# Patient Record
Sex: MALE | Race: WHITE | NOT HISPANIC OR LATINO | Employment: UNEMPLOYED | URBAN - NONMETROPOLITAN AREA
[De-identification: names, ages, dates, MRNs, and addresses within clinical notes are randomized per-mention and may not be internally consistent; named-entity substitution may affect disease eponyms.]

---

## 2017-01-09 ENCOUNTER — OFFICE VISIT (OUTPATIENT)
Dept: OPHTHALMOLOGY | Facility: CLINIC | Age: 62
End: 2017-01-09

## 2017-01-09 DIAGNOSIS — E11.3599 PROLIFERATIVE DIABETIC RETINOPATHY WITHOUT MACULAR EDEMA ASSOCIATED WITH TYPE 2 DIABETES MELLITUS (HCC): Primary | ICD-10-CM

## 2017-01-09 DIAGNOSIS — IMO0002 NUCLEAR CATARACT, RIGHT: ICD-10-CM

## 2017-01-09 DIAGNOSIS — Z96.1 PSEUDOPHAKIA: ICD-10-CM

## 2017-01-09 DIAGNOSIS — H25.011 CORTICAL AGE-RELATED CATARACT, RIGHT: ICD-10-CM

## 2017-01-09 PROBLEM — H25.019 CORTICAL AGE-RELATED CATARACT: Status: ACTIVE | Noted: 2017-01-09

## 2017-01-09 PROCEDURE — 99024 POSTOP FOLLOW-UP VISIT: CPT | Performed by: OPHTHALMOLOGY

## 2017-01-09 NOTE — PROGRESS NOTES
Subjective   Gerardo Mcneal is a 61 y.o. male.   Chief Complaint   Patient presents with   • Pseudophakia left eye         Review of Systems    Objective   Visual Acuity (Snellen - Linear)      Right Left   Dist sc 20/80 20/25 -2            Manifest Refraction      Sphere Cylinder Axis Dist   Right       Left -0.50 +1.00 165 20/20                Not recorded            Tonometry      Right Left   Pressure  23           Neuro/Psych     Oriented x3:  Yes         Main Ophthalmology Exam     External Exam      Right Left    External Normal Normal      Slit Lamp Exam      Right Left    Lids/Lashes Normal Normal    Conjunctiva/Sclera White and quiet White and quiet    Cornea Clear Clear    Anterior Chamber Deep and quiet Deep and quiet    Iris Round and reactive Round and reactive    Lens 2+ Cortical cataract, 2+ Nuclear sclerosis Posterior chamber intraocular lens    Vitreous Vitreous hemorrhage Vitreous hemorrhage      Fundus Exam      Right Left    Disc  Normal rim 0.2 ST, 0.25 IT; 0.3DD NV sup temp    Macula  Intraretinal hemorrhage, no macular thickening    Periphery  declined dilation                Assessment/Plan   Gerardo was seen today for pseudophakia left eye.    Diagnoses and all orders for this visit:    Proliferative diabetic retinopathy without macular edema associated with type 2 diabetes mellitus    Pseudophakia  Comments:  doing well, IOP down off steroids    Nuclear cataract, right    Cortical age-related cataract, right      F/u 6 weeks  Consider CE OD  Dilate next visit, OCT

## 2017-01-09 NOTE — MR AVS SNAPSHOT
Gerardo MONROE Mcneal   1/9/2017 3:15 PM   Office Visit    Dept Phone:  [phone]   Encounter #:  [id number]    Provider:  Omi Santos MD   Department:  [facility] OPHTHALMOLOGY                Your Full Care Plan              Your Updated Medication List          This list is accurate as of: 1/9/17  3:37 PM.  Always use your most recent med list.                albuterol 108 (90 BASE) MCG/ACT inhaler   Commonly known as:  PROVENTIL HFA;VENTOLIN HFA       benzonatate 200 MG capsule   Commonly known as:  TESSALON       brimonidine-timolol 0.2-0.5 % ophthalmic solution   Commonly known as:  COMBIGAN   Administer 1 drop into the left eye Every 12 (Twelve) Hours.       clonazePAM 0.5 MG tablet   Commonly known as:  KlonoPIN       DUREZOL 0.05 % ophthalmic emulsion   Generic drug:  difluprednate       furosemide 40 MG tablet   Commonly known as:  LASIX       * GABAPENTIN PO       * gabapentin 400 MG capsule   Commonly known as:  NEURONTIN       Insulin Admin Supplies misc       INVOKANA 100 MG tablet   Generic drug:  Canagliflozin       levoFLOXacin 750 MG tablet   Commonly known as:  LEVAQUIN       LEVOTHYROXINE SODIUM PO       LISINOPRIL PO       LORATADINE PO       MELOXICAM PO       METFORMIN HCL PO       PARoxetine 10 MG tablet   Commonly known as:  PAXIL       TRAMADOL HCL PO       * Notice:  This list has 2 medication(s) that are the same as other medications prescribed for you. Read the directions carefully, and ask your doctor or other care provider to review them with you.            You Were Diagnosed With        Codes Comments    Proliferative diabetic retinopathy without macular edema associated with type 2 diabetes mellitus    -  Primary ICD-10-CM: E11.3599  ICD-9-CM: 250.50, 362.02     Pseudophakia     ICD-10-CM: Z96.1  ICD-9-CM: V43.1     Nuclear cataract, right     ICD-10-CM: H25.11  ICD-9-CM: 366.16     Cortical age-related cataract, right     ICD-10-CM:  H25.011  ICD-9-CM: 366.15       Instructions     None    Patient Instructions History      Upcoming Appointments     Visit Type Date Time Department    OFFICE VISIT 2017  3:15 PM [facility] [place]Y [facility]    OFFICE VISIT 2017  3:15 PM [facility] [place] [facility]     [facility] [employer] allows you to send messages to your doctor, view your test results, renew your prescriptions, schedule appointments, and more. To sign up, go to [employer] and click on the Sign Up Now link in the New User? box. Enter your [employer] Activation Code exactly as it appears below along with the last four digits of your Social Security Number and your Date of Birth () to complete the sign-up process. If you do not sign up before the expiration date, you must request a new code.    [employer] Activation Code: [email or web address]  Expires: 2017  3:36 PM    If you have questions, you can email [email or web address] or call [phone] to talk to our [employer] staff. Remember, [employer] is NOT to be used for urgent needs. For medical emergencies, dial 911.               Other Info from Your Visit           Your Appointments     2017  3:15 PM CST   Office Visit with Omi Santos MD   [facility] OPHTHALMOLOGY (--)    [address] [place] [address] [address]  [facility] [address] [place] [id number] [phone]           Arrive 15 minutes prior to appointment.              Allergies     No Known Allergies      Reason for Visit     Pseudophakia left eye           Vital Signs     Smoking Status                   Never Smoker           Problems and Diagnoses Noted     Cataract    Nuclear cataract    Type 2 or unspecified type diabetes mellitus with ophthalmic manifestations    Pseudophakia

## 2017-01-25 ENCOUNTER — OFFICE VISIT (OUTPATIENT)
Dept: OPHTHALMOLOGY | Facility: CLINIC | Age: 62
End: 2017-01-25

## 2017-01-25 DIAGNOSIS — Z96.1 PSEUDOPHAKIA: ICD-10-CM

## 2017-01-25 DIAGNOSIS — H25.011 CORTICAL AGE-RELATED CATARACT, RIGHT: ICD-10-CM

## 2017-01-25 DIAGNOSIS — H40.003 BORDERLINE GLAUCOMA, BILATERAL: ICD-10-CM

## 2017-01-25 DIAGNOSIS — E11.3599 PROLIFERATIVE DIABETIC RETINOPATHY WITHOUT MACULAR EDEMA ASSOCIATED WITH TYPE 2 DIABETES MELLITUS (HCC): ICD-10-CM

## 2017-01-25 DIAGNOSIS — H43.12 VITREOUS HEMORRHAGE, LEFT (HCC): Primary | ICD-10-CM

## 2017-01-25 DIAGNOSIS — IMO0002 NUCLEAR CATARACT, RIGHT: ICD-10-CM

## 2017-01-25 PROBLEM — H43.10 VITREOUS HEMORRHAGE (HCC): Status: ACTIVE | Noted: 2017-01-25

## 2017-01-25 PROBLEM — H40.009 BORDERLINE GLAUCOMA: Status: ACTIVE | Noted: 2017-01-25

## 2017-01-25 PROCEDURE — 92014 COMPRE OPH EXAM EST PT 1/>: CPT | Performed by: OPHTHALMOLOGY

## 2017-01-25 PROCEDURE — 92133 CPTRZD OPH DX IMG PST SGM ON: CPT | Performed by: OPHTHALMOLOGY

## 2017-01-25 NOTE — PROGRESS NOTES
Subjective   Gerardo Mcneal is a 61 y.o. male.   Chief Complaint   Patient presents with   • bleeding behind left eye     HPI     BV OS x 4 days, no flashes       Last edited by Omi Santos MD on 1/25/2017  1:28 PM.       Review of Systems    Objective   Visual Acuity (Snellen - Linear)      Right Left   Dist sc 20/60 -1 20/50 -2                   Not recorded            Tonometry      Right Left   Pressure 16 15           Neuro/Psych     Oriented x3:  Yes         Main Ophthalmology Exam     External Exam      Right Left    External Normal Normal      Slit Lamp Exam      Right Left    Lids/Lashes Normal Normal    Conjunctiva/Sclera White and quiet White and quiet    Cornea Clear Clear    Anterior Chamber Deep and quiet Deep and quiet    Iris Round and reactive Round and reactive    Lens 2+ Cortical cataract, 2+ Nuclear sclerosis Posterior chamber intraocular lens    Vitreous Vitreous hemorrhage Vitreous hemorrhage, diffuse      Fundus Exam      Right Left    Disc Thin rim 0.15 St, IT Normal rim 0.2 ST, 0.25 IT; 0.3DD NV sup temp    Macula Hemorrhage, no macular thickening diff view    Vessels regressed NVD inf temp arcad     Periphery Laser scars, PRP PRP scars, flat              OCT Disc:   OD- relative superior thinning and relative inferior thinning  OS- inferior thinning    Assessment/Plan   Gerardo was seen today for bleeding behind left eye.    Diagnoses and all orders for this visit:    Vitreous hemorrhage, left    Proliferative diabetic retinopathy without macular edema associated with type 2 diabetes mellitus    Pseudophakia    Nuclear cataract, right    Cortical age-related cataract, right    Borderline glaucoma, bilateral  Comments:  IOP down      rec Dr Kim carmen, pt desires to wait  F/u one month.prn

## 2017-02-20 ENCOUNTER — OFFICE VISIT (OUTPATIENT)
Dept: OPHTHALMOLOGY | Facility: CLINIC | Age: 62
End: 2017-02-20

## 2017-02-20 DIAGNOSIS — H43.12 VITREOUS HEMORRHAGE, LEFT (HCC): Primary | ICD-10-CM

## 2017-02-20 DIAGNOSIS — E11.3599 PROLIFERATIVE DIABETIC RETINOPATHY WITHOUT MACULAR EDEMA ASSOCIATED WITH TYPE 2 DIABETES MELLITUS (HCC): ICD-10-CM

## 2017-02-20 DIAGNOSIS — Z96.1 PSEUDOPHAKIA: ICD-10-CM

## 2017-02-20 PROCEDURE — 99024 POSTOP FOLLOW-UP VISIT: CPT | Performed by: OPHTHALMOLOGY

## 2017-02-20 NOTE — PROGRESS NOTES
Subjective   Gerardo Mcneal is a 61 y.o. male.   Chief Complaint   Patient presents with   • Cataract   • vitreous hemorrhage left eye     HPI     Cataract   Laterality: right eye           Comments   Vision better       Last edited by Omi Santos MD on 2/20/2017  3:55 PM. (History)          Review of Systems    Objective   Visual Acuity (Snellen - Linear)      Right Left   Dist sc 20/80 -1 20/25                   Not recorded            Tonometry      Right Left   Pressure  18           Neuro/Psych     Oriented x3:  Yes         Main Ophthalmology Exam     External Exam      Right Left    External Normal Normal      Slit Lamp Exam      Right Left    Lids/Lashes Normal Normal    Conjunctiva/Sclera White and quiet White and quiet    Cornea Clear Clear    Anterior Chamber Deep and quiet Deep and quiet    Iris Round and reactive Round and reactive    Lens 2+ Cortical cataract, 2+ Nuclear sclerosis Posterior chamber intraocular lens    Vitreous Vitreous hemorrhage Vitreous hemorrhage, inf      Fundus Exam      Right Left    Disc  Normal rim 0.2 ST, 0.25 IT; 0.3DD NV sup temp    Macula  Normal    Vessels  Normal    Periphery  PRP scars, flat                Assessment/Plan   Diagnoses and all orders for this visit:    Vitreous hemorrhage, left  Comments:  resolving    Proliferative diabetic retinopathy without macular edema associated with type 2 diabetes mellitus    Pseudophakia    consider further PRP OS when able       Return in about 6 weeks (around 4/3/2017).

## 2017-04-20 ENCOUNTER — OFFICE VISIT (OUTPATIENT)
Dept: OPHTHALMOLOGY | Facility: CLINIC | Age: 62
End: 2017-04-20

## 2017-04-20 DIAGNOSIS — Z79.4 TYPE 2 DIABETES MELLITUS WITH PROLIFERATIVE RETINOPATHY OF BOTH EYES, WITH LONG-TERM CURRENT USE OF INSULIN, UNSPECIFIED PROLIFERATIVE RETINOPATHY TYPE: ICD-10-CM

## 2017-04-20 DIAGNOSIS — E11.3593 TYPE 2 DIABETES MELLITUS WITH PROLIFERATIVE RETINOPATHY OF BOTH EYES, WITH LONG-TERM CURRENT USE OF INSULIN, UNSPECIFIED PROLIFERATIVE RETINOPATHY TYPE: ICD-10-CM

## 2017-04-20 DIAGNOSIS — Z96.1 PSEUDOPHAKIA: ICD-10-CM

## 2017-04-20 DIAGNOSIS — H43.12 VITREOUS HEMORRHAGE, LEFT (HCC): Primary | ICD-10-CM

## 2017-04-20 DIAGNOSIS — H26.9 CATARACT: ICD-10-CM

## 2017-04-20 PROCEDURE — 92134 CPTRZ OPH DX IMG PST SGM RTA: CPT | Performed by: OPHTHALMOLOGY

## 2017-04-20 PROCEDURE — 99214 OFFICE O/P EST MOD 30 MIN: CPT | Performed by: OPHTHALMOLOGY

## 2017-04-20 NOTE — PROGRESS NOTES
Subjective   Gerardo Mcneal is a 61 y.o. male.   Chief Complaint   Patient presents with   • Cataract   • Diabetic Eye Exam     Last HgbA1C was 8.5-9%       HPI     Cataract   In right eye.  Associated symptoms include blurred vision and glare.  Onset was gradual.           Diabetic Eye Exam   Associated symptoms include blurred vision.  Diabetes characteristics include Type 2 and on insulin.  Duration of 30 years.  Blood sugar level is uncontrolled. Additional comments: Last HgbA1C was 8.5-9%             Comments   Cataract in the right eye desires surgery.Having trouble in the left eye with his retinopathy.       Last edited by Akil Don MD on 4/20/2017  2:59 PM. (History)          Review of Systems   Constitutional: Negative for chills and fever.   Respiratory: Positive for shortness of breath.         SOB when walking for long distances   Cardiovascular: Negative for chest pain.   Gastrointestinal: Negative for abdominal pain.   Genitourinary: Negative for dysuria.   Musculoskeletal: Negative for myalgias.   Psychiatric/Behavioral: Negative for confusion.       Objective   Base Eye Exam     Visual Acuity (Snellen - Linear)      Right Left   Dist cc 20/100 20/50         Tonometry (Applanation, 3:01 PM)      Right Left   Pressure 18 18         Pupils      Pupils   Right PERRL   Left PERRL         Visual Fields      Left Right   Result Full          Extraocular Movement      Right Left   Result Full Full         Dilation     Both eyes:  1.0% Mydriacyl, 2.5% Shiva Synephrine @ 3:03 PM            Additional Tests     Potential Acuity Meter      Right Left   CASTRO 20/40                Slit Lamp and Fundus Exam     Slit Lamp Exam      Right Left    Iris no NVI no NVI    Lens Dilates well to ~8mm, good red reflex, Nuclear sclerosis Posterior chamber intraocular lens    Vitreous  Hemorrhage      Fundus Exam      Right Left    Disc Neovascularization Neovascularization    Vessels vascular from superiorly on the  arcade Normal    Periphery scattered PRP scattered PRP with haze; more dense blood inferiorly and nasally                OCT Macula:  OD- no edema  OS- no edema    Assessment/Plan   Diagnoses and all orders for this visit:    Vitreous hemorrhage, left  Comments:  Please see #2..  Patient will need more PRP in both eyes.  Follow-up April 28, 2017 for reevaluation.    Type 2 diabetes mellitus with proliferative retinopathy of both eyes, with long-term current use of insulin, unspecified proliferative retinopathy type  Comments:  Patient with hemorrhage and left eye.  Discussed use of laser when he is less dense.  Patient will sleep without elevated and return to clinic in 1 week for reevaluation.  There is no macular edema on OCT.    Cataract  Comments:  Visually Significant of the right eye will continue to observe.  When retinopathy under better control and left eye will schedule extraction.     Pseudophakia  Comments:  Centered continue to observe.  Plan:       Return in about 8 days (around 4/28/2017).                            This document has been signed by Akil Don MD on April 20, 2017 4:50 PM

## 2017-04-28 ENCOUNTER — OFFICE VISIT (OUTPATIENT)
Dept: OPHTHALMOLOGY | Facility: CLINIC | Age: 62
End: 2017-04-28

## 2017-04-28 DIAGNOSIS — IMO0002 NUCLEAR CATARACT, RIGHT: Primary | ICD-10-CM

## 2017-04-28 DIAGNOSIS — E11.3599 PROLIFERATIVE DIABETIC RETINOPATHY WITHOUT MACULAR EDEMA ASSOCIATED WITH TYPE 2 DIABETES MELLITUS (HCC): ICD-10-CM

## 2017-04-28 PROCEDURE — 99213 OFFICE O/P EST LOW 20 MIN: CPT | Performed by: OPHTHALMOLOGY

## 2017-04-28 PROCEDURE — 76512 OPH US DX B-SCAN: CPT | Performed by: OPHTHALMOLOGY

## 2017-04-28 RX ORDER — LEVOTHYROXINE SODIUM 0.07 MG/1
TABLET ORAL
COMMUNITY
Start: 2017-02-01

## 2017-04-28 RX ORDER — HUMAN INSULIN 100 [IU]/ML
INJECTION, SUSPENSION SUBCUTANEOUS
COMMUNITY
Start: 2017-03-24

## 2017-04-28 RX ORDER — HUMAN INSULIN 100 [IU]/ML
INJECTION, SOLUTION SUBCUTANEOUS
COMMUNITY
Start: 2017-03-24

## 2017-04-28 RX ORDER — TRAMADOL HYDROCHLORIDE 50 MG/1
TABLET ORAL
COMMUNITY
Start: 2017-04-14

## 2017-04-28 NOTE — PROGRESS NOTES
Subjective   Gerardo Mcneal is a 61 y.o. male.   Chief Complaint   Patient presents with   • Vitreous hemorrhage     Patient states that is vision decreased 4/27 -- thought he had another bleed so came in. +Hx PDR OU     HPI     Vitreous hemorrhage    Additional comments: Patient states that is vision decreased 4/27 -- thought he had another bleed so came in. +Hx PDR OU       Last edited by Akil Don MD on 4/28/2017  4:36 PM. (History)          Review of Systems   Constitutional: Negative for chills and fever.   Respiratory: Negative for shortness of breath.    Cardiovascular: Negative for chest pain.   Gastrointestinal: Negative for abdominal pain.   Genitourinary: Negative for dysuria.   Musculoskeletal: Negative for myalgias.   Psychiatric/Behavioral: Negative for confusion.       Objective   Base Eye Exam     Visual Acuity (Snellen - Linear)      Right Left   Dist sc 20/100 HM         Dilation     Left eye:  2.5% Shiva Synephrine @ 3:08 PM            Slit Lamp and Fundus Exam     External Exam      Right Left    External Normal Normal      Slit Lamp Exam      Right Left    Lids/Lashes Normal Normal    Conjunctiva/Sclera White and quiet White and quiet    Cornea Clear Clear    Anterior Chamber Deep and quiet Deep     Iris no NVI no NVI    Lens Dilates well to ~8mm, good red reflex, Nuclear sclerosis Posterior chamber intraocular lens    Vitreous Normal Hemorrhage      Fundus Exam      Right Left    Disc  no view                 PROCEDURE  B-Scan: left eye eye.   The B-Scan ultrasound was performed with the findings: +vitreous debris without RD        Assessment/Plan   Diagnoses and all orders for this visit:    Nuclear cataract, right  Comments:  Visually significant. Observe at this time.    Proliferative diabetic retinopathy without macular edema associated with type 2 diabetes mellitus  Comments:  Patient with vitreous hemorrhage OS. B-scan does not show RD. Recommend keep head of bed elevated. f/u in  2 weeks.     Plan:       Return in about 2 weeks (around 5/12/2017).                            This document has been signed by Akil Don MD on April 28, 2017 4:39 PM

## 2017-05-12 ENCOUNTER — OFFICE VISIT (OUTPATIENT)
Dept: OPHTHALMOLOGY | Facility: CLINIC | Age: 62
End: 2017-05-12

## 2017-05-12 DIAGNOSIS — H43.12 VITREOUS HEMORRHAGE, LEFT (HCC): ICD-10-CM

## 2017-05-12 DIAGNOSIS — E11.3599 PROLIFERATIVE DIABETIC RETINOPATHY WITHOUT MACULAR EDEMA ASSOCIATED WITH TYPE 2 DIABETES MELLITUS (HCC): Primary | ICD-10-CM

## 2017-05-12 DIAGNOSIS — H26.9 CATARACT: ICD-10-CM

## 2017-05-12 DIAGNOSIS — Z96.1 PSEUDOPHAKIA: ICD-10-CM

## 2017-05-12 PROCEDURE — 99213 OFFICE O/P EST LOW 20 MIN: CPT | Performed by: OPHTHALMOLOGY

## 2017-06-12 ENCOUNTER — OFFICE VISIT (OUTPATIENT)
Dept: ORTHOPEDIC SURGERY | Facility: CLINIC | Age: 62
End: 2017-06-12

## 2017-06-12 VITALS — WEIGHT: 315 LBS | BODY MASS INDEX: 50.62 KG/M2 | HEIGHT: 66 IN

## 2017-06-12 DIAGNOSIS — M17.0 PRIMARY OSTEOARTHRITIS OF BOTH KNEES: Primary | ICD-10-CM

## 2017-06-12 DIAGNOSIS — M25.561 RIGHT KNEE PAIN, UNSPECIFIED CHRONICITY: ICD-10-CM

## 2017-06-12 PROCEDURE — 99214 OFFICE O/P EST MOD 30 MIN: CPT | Performed by: NURSE PRACTITIONER

## 2017-06-12 NOTE — PROGRESS NOTES
Gerardo Mcneal is a 62 y.o. 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    • Exanthematous disorder    • Impacted cerumen of left ear    • Infection of skin and subcutaneous tissue    • Influenza    • Lymphadenopathy    • Nuclear cataract    • Proliferative diabetic retinopathy     stable, s/p PRP    • Vitreous hemorrhage     OS, INCREASED       No Known Allergies      Current Outpatient Prescriptions:   •  benzonatate (TESSALON) 200 MG capsule, Take 200 mg by mouth 3 (three) times a day as needed for cough., Disp: , Rfl:   •  clonazePAM (KlonoPIN) 0.5 MG tablet, Take 0.5 mg by mouth daily., Disp: , Rfl:   •  furosemide (LASIX) 40 MG tablet, , Disp: , Rfl:   •  gabapentin (NEURONTIN) 400 MG capsule, , Disp: , Rfl:   •  GABAPENTIN PO, Take  by mouth., Disp: , Rfl:   •  Insulin Admin Supplies misc, , Disp: , Rfl:   •  levothyroxine (SYNTHROID, LEVOTHROID) 75 MCG tablet, , Disp: , Rfl:   •  LISINOPRIL PO, Take  by mouth., Disp: , Rfl:   •  LORATADINE PO, Take  by mouth., Disp: , Rfl:   •  MELOXICAM PO, Take  by mouth., Disp: , Rfl:   •  METFORMIN HCL PO, Take  by mouth., Disp: , Rfl:   •  NOVOLIN N RELION 100 UNIT/ML injection, , Disp: , Rfl:   •  NOVOLIN R RELION 100 UNIT/ML injection, , Disp: , Rfl:   •  PARoxetine (PAXIL) 10 MG tablet, Take 10 mg by mouth. 1 tablet(s) by mouth before noon, Disp: , Rfl:   •  traMADol (ULTRAM) 50 MG tablet, , Disp: , Rfl:     Past Surgical History:   Procedure Laterality Date   • OTHER SURGICAL HISTORY  06/01/2015    FUNDUS PHOTOGRAPHY 62105 (Proliferative diabetic retinopathy)    • OTHER SURGICAL  "HISTORY      TREATMENT RETINAL LESION W/PHOTOCOAGULATIO 09473 (Proliferative diabetic retinopathy) (2): 09/16/2015, 06/12/2015       ROS  No fevers or chills.  No chest pain or shortness of air.  No GI or  disturbances.    PHYSICAL EXAMINATION:       Ht 66\" (167.6 cm)  Wt (!) 420 lb (191 kg)  BMI 67.79 kg/m2    Physical Exam   Constitutional: He is oriented to person, place, and time. Vital signs are normal. He appears well-developed and well-nourished. He is cooperative.   HENT:   Head: Normocephalic and atraumatic.   Neck: Trachea normal and phonation normal.   Pulmonary/Chest: Effort normal. No respiratory distress.   Abdominal: Soft. Normal appearance. He exhibits no distension.   Musculoskeletal:        Right knee: He exhibits no effusion.        Left knee: He exhibits no effusion.   Neurological: He is alert and oriented to person, place, and time. GCS eye subscore is 4. GCS verbal subscore is 5. GCS motor subscore is 6.   Skin: Skin is warm, dry and intact.   Psychiatric: He has a normal mood and affect. His speech is normal and behavior is normal. Judgment and thought content normal. Cognition and memory are normal.   Vitals reviewed.      GAIT:     []  Normal  []  Antalgic    Assistive device: []  None  []  Walker     []  Crutches  []  Cane     []  Wheelchair  []  Stretcher    Right Knee Exam     Tenderness   The patient is experiencing tenderness in the pes anserinus, medial joint line and lateral joint line.    Range of Motion   Extension: normal   Flexion: abnormal     Other   Erythema: absent  Scars: absent  Sensation: normal  Pulse: present  Swelling: mild  Other tests: no effusion present      Left Knee Exam     Tenderness   The patient is experiencing tenderness in the medial joint line, pes anserinus and lateral joint line.    Range of Motion   Extension: normal   Flexion: abnormal     Other   Erythema: absent  Scars: absent  Sensation: normal  Pulse: present  Swelling: mild  Effusion: no " effusion present        Large Joint Arthrocentesis  Date/Time: 6/13/2017 8:04 AM  Consent given by: patient  Timeout: Immediately prior to procedure a time out was called to verify the correct patient, procedure, equipment, support staff and site/side marked as required   Supporting Documentation  Indications: pain   Procedure Details  Location: knee - L knee  Preparation: Patient was prepped and draped in the usual sterile fashion  Needle size: 22 G  Approach: anteromedial  Medications administered: 2 mL lidocaine 1 %; 40 mg triamcinolone acetonide 40 MG/ML  Patient tolerance: patient tolerated the procedure well with no immediate complications            No results found.          ASSESSMENT:    Diagnoses and all orders for this visit:    Primary osteoarthritis of both knees    Right knee pain, unspecified chronicity    Other orders  -     Large Joint Arthrocentesis          PLAN  Repeated injected today and recommended progression of ROM and activity as tolerated based on pain.   Continue aggressive weight reduction.   No Follow-up on file.    Noah Gentile, APRN

## 2017-06-13 PROCEDURE — 20610 DRAIN/INJ JOINT/BURSA W/O US: CPT | Performed by: NURSE PRACTITIONER

## 2017-06-13 RX ORDER — LIDOCAINE HYDROCHLORIDE 10 MG/ML
2 INJECTION, SOLUTION INFILTRATION; PERINEURAL
Status: COMPLETED | OUTPATIENT
Start: 2017-06-13 | End: 2017-06-13

## 2017-06-13 RX ORDER — TRIAMCINOLONE ACETONIDE 40 MG/ML
40 INJECTION, SUSPENSION INTRA-ARTICULAR; INTRAMUSCULAR
Status: COMPLETED | OUTPATIENT
Start: 2017-06-13 | End: 2017-06-13

## 2017-06-13 RX ADMIN — TRIAMCINOLONE ACETONIDE 40 MG: 40 INJECTION, SUSPENSION INTRA-ARTICULAR; INTRAMUSCULAR at 08:04

## 2017-06-13 RX ADMIN — LIDOCAINE HYDROCHLORIDE 2 ML: 10 INJECTION, SOLUTION INFILTRATION; PERINEURAL at 08:04

## 2017-06-26 ENCOUNTER — OFFICE VISIT (OUTPATIENT)
Dept: ORTHOPEDIC SURGERY | Facility: CLINIC | Age: 62
End: 2017-06-26

## 2017-06-26 VITALS — WEIGHT: 315 LBS | HEIGHT: 66 IN | BODY MASS INDEX: 50.62 KG/M2

## 2017-06-26 DIAGNOSIS — M17.0 PRIMARY OSTEOARTHRITIS OF BOTH KNEES: Primary | ICD-10-CM

## 2017-06-26 DIAGNOSIS — M25.561 RIGHT KNEE PAIN, UNSPECIFIED CHRONICITY: ICD-10-CM

## 2017-06-26 PROCEDURE — 20610 DRAIN/INJ JOINT/BURSA W/O US: CPT | Performed by: NURSE PRACTITIONER

## 2017-06-26 PROCEDURE — 99214 OFFICE O/P EST MOD 30 MIN: CPT | Performed by: NURSE PRACTITIONER

## 2017-06-26 RX ORDER — TRIAMCINOLONE ACETONIDE 40 MG/ML
40 INJECTION, SUSPENSION INTRA-ARTICULAR; INTRAMUSCULAR
Status: COMPLETED | OUTPATIENT
Start: 2017-06-26 | End: 2017-06-26

## 2017-06-26 RX ORDER — LIDOCAINE HYDROCHLORIDE 10 MG/ML
2 INJECTION, SOLUTION INFILTRATION; PERINEURAL
Status: COMPLETED | OUTPATIENT
Start: 2017-06-26 | End: 2017-06-26

## 2017-06-26 RX ADMIN — LIDOCAINE HYDROCHLORIDE 2 ML: 10 INJECTION, SOLUTION INFILTRATION; PERINEURAL at 15:04

## 2017-06-26 RX ADMIN — TRIAMCINOLONE ACETONIDE 40 MG: 40 INJECTION, SUSPENSION INTRA-ARTICULAR; INTRAMUSCULAR at 15:04

## 2017-06-26 NOTE — PROGRESS NOTES
Gerardo Mcneal is a 62 y.o. male returns for     Chief Complaint   Patient presents with   • Right Knee - Follow-up       HISTORY OF PRESENT ILLNESS:  Patient returns today for follow-up of right knee pain.  Pain continues without improvement.  He continues to use his walker to modify his weightbearing, continues the meloxicam as directed and has tried to lose weight however this is failed to improve his discomfort.  He is requested an intra-articular injection of steroids into the right knee today.     CONCURRENT MEDICAL HISTORY:    Past Medical History:   Diagnosis Date   • Acute bronchitis    • Acute otitis media, left    • Cortical senile cataract    • Diabetic oculopathy associated with type 2 diabetes mellitus    • Exanthematous disorder    • Impacted cerumen of left ear    • Infection of skin and subcutaneous tissue    • Influenza    • Lymphadenopathy    • Nuclear cataract    • Proliferative diabetic retinopathy     stable, s/p PRP    • Vitreous hemorrhage     OS, INCREASED       No Known Allergies      Current Outpatient Prescriptions:   •  benzonatate (TESSALON) 200 MG capsule, Take 200 mg by mouth 3 (three) times a day as needed for cough., Disp: , Rfl:   •  clonazePAM (KlonoPIN) 0.5 MG tablet, Take 0.5 mg by mouth daily., Disp: , Rfl:   •  furosemide (LASIX) 40 MG tablet, , Disp: , Rfl:   •  gabapentin (NEURONTIN) 400 MG capsule, , Disp: , Rfl:   •  GABAPENTIN PO, Take  by mouth., Disp: , Rfl:   •  Insulin Admin Supplies misc, , Disp: , Rfl:   •  levothyroxine (SYNTHROID, LEVOTHROID) 75 MCG tablet, , Disp: , Rfl:   •  LISINOPRIL PO, Take  by mouth., Disp: , Rfl:   •  LORATADINE PO, Take  by mouth., Disp: , Rfl:   •  MELOXICAM PO, Take  by mouth., Disp: , Rfl:   •  METFORMIN HCL PO, Take  by mouth., Disp: , Rfl:   •  NOVOLIN N RELION 100 UNIT/ML injection, , Disp: , Rfl:   •  NOVOLIN R RELION 100 UNIT/ML injection, , Disp: , Rfl:   •  PARoxetine (PAXIL) 10 MG tablet, Take 10 mg by mouth. 1 tablet(s) by mouth  "before noon, Disp: , Rfl:   •  traMADol (ULTRAM) 50 MG tablet, , Disp: , Rfl:     Past Surgical History:   Procedure Laterality Date   • OTHER SURGICAL HISTORY  06/01/2015    FUNDUS PHOTOGRAPHY 54405 (Proliferative diabetic retinopathy)    • OTHER SURGICAL HISTORY      TREATMENT RETINAL LESION W/PHOTOCOAGULATIO 79863 (Proliferative diabetic retinopathy) (2): 09/16/2015, 06/12/2015       ROS  No fevers or chills.  No chest pain or shortness of air.  No GI or  disturbances.    PHYSICAL EXAMINATION:       Ht 66\" (167.6 cm)  Wt (!) 420 lb (191 kg)  BMI 67.79 kg/m2    Physical Exam   Constitutional: He is oriented to person, place, and time. Vital signs are normal. He appears well-developed and well-nourished. He is cooperative.   HENT:   Head: Normocephalic and atraumatic.   Neck: Trachea normal and phonation normal.   Pulmonary/Chest: Effort normal. No respiratory distress.   Abdominal: Soft. Normal appearance. He exhibits no distension.   Musculoskeletal:        Right knee: He exhibits no effusion.        Left knee: He exhibits no effusion.   Neurological: He is alert and oriented to person, place, and time. GCS eye subscore is 4. GCS verbal subscore is 5. GCS motor subscore is 6.   Skin: Skin is warm, dry and intact.   Psychiatric: He has a normal mood and affect. His speech is normal and behavior is normal. Judgment and thought content normal. Cognition and memory are normal.   Vitals reviewed.      GAIT:     []  Normal  []  Antalgic    Assistive device: []  None  []  Walker     []  Crutches  []  Cane     []  Wheelchair  []  Stretcher    Right Knee Exam     Tenderness   The patient is experiencing tenderness in the pes anserinus, medial joint line and lateral joint line.    Range of Motion   Extension: normal   Flexion: abnormal     Other   Erythema: absent  Scars: absent  Sensation: normal  Pulse: present  Swelling: mild  Other tests: no effusion present      Left Knee Exam     Tenderness   The patient is " experiencing tenderness in the medial joint line, pes anserinus and lateral joint line.    Range of Motion   Extension: normal   Flexion: abnormal     Other   Erythema: absent  Scars: absent  Sensation: normal  Pulse: present  Swelling: mild  Effusion: no effusion present              Large Joint Arthrocentesis  Date/Time: 6/26/2017 3:04 PM  Consent given by: patient  Site marked: site marked  Timeout: Immediately prior to procedure a time out was called to verify the correct patient, procedure, equipment, support staff and site/side marked as required   Supporting Documentation  Indications: pain   Procedure Details  Location: knee - R knee  Preparation: Patient was prepped and draped in the usual sterile fashion  Needle size: 22 G  Approach: anteromedial  Medications administered: 40 mg triamcinolone acetonide 40 MG/ML; 2 mL lidocaine 1 %  Patient tolerance: patient tolerated the procedure well with no immediate complications                  ASSESSMENT:    Diagnoses and all orders for this visit:    Primary osteoarthritis of both knees  -     Large Joint Arthrocentesis    Right knee pain, unspecified chronicity  -     Large Joint Arthrocentesis          PLAN  He did the intra-articular injection of steroids today, recommended follow-up as needed for exacerbations of pain continued low impact exercises and aggressive weight reduction with ago to reach up BMI below 45.  No Follow-up on file.    Noah Gentile, APRN

## 2017-12-11 ENCOUNTER — OFFICE VISIT (OUTPATIENT)
Dept: ORTHOPEDIC SURGERY | Facility: CLINIC | Age: 62
End: 2017-12-11

## 2017-12-11 VITALS — HEIGHT: 66 IN | WEIGHT: 315 LBS | BODY MASS INDEX: 50.62 KG/M2

## 2017-12-11 DIAGNOSIS — M25.562 CHRONIC PAIN OF LEFT KNEE: ICD-10-CM

## 2017-12-11 DIAGNOSIS — M17.0 PRIMARY OSTEOARTHRITIS OF BOTH KNEES: Primary | ICD-10-CM

## 2017-12-11 DIAGNOSIS — G89.29 CHRONIC PAIN OF LEFT KNEE: ICD-10-CM

## 2017-12-11 PROCEDURE — 99214 OFFICE O/P EST MOD 30 MIN: CPT | Performed by: NURSE PRACTITIONER

## 2017-12-11 PROCEDURE — 20610 DRAIN/INJ JOINT/BURSA W/O US: CPT | Performed by: NURSE PRACTITIONER

## 2017-12-11 RX ORDER — LIDOCAINE HYDROCHLORIDE 10 MG/ML
2 INJECTION, SOLUTION INFILTRATION; PERINEURAL
Status: COMPLETED | OUTPATIENT
Start: 2017-12-11 | End: 2017-12-11

## 2017-12-11 RX ORDER — TRIAMCINOLONE ACETONIDE 40 MG/ML
40 INJECTION, SUSPENSION INTRA-ARTICULAR; INTRAMUSCULAR
Status: COMPLETED | OUTPATIENT
Start: 2017-12-11 | End: 2017-12-11

## 2017-12-11 RX ADMIN — LIDOCAINE HYDROCHLORIDE 2 ML: 10 INJECTION, SOLUTION INFILTRATION; PERINEURAL at 15:53

## 2017-12-11 RX ADMIN — TRIAMCINOLONE ACETONIDE 40 MG: 40 INJECTION, SUSPENSION INTRA-ARTICULAR; INTRAMUSCULAR at 15:53

## 2017-12-11 NOTE — PROGRESS NOTES
Gerardo Mcneal is a 62 y.o. male returns for     Chief Complaint   Patient presents with   • Left Knee - Follow-up       HISTORY OF PRESENT ILLNESS: \  63 y/o male into the office today c/o chronic left knee pain.    He requesting a repeat of the steroid injection he previously had which has improved his discomfort in the past.           CONCURRENT MEDICAL HISTORY:    Past Medical History:   Diagnosis Date   • Acute bronchitis    • Acute otitis media, left    • Cortical senile cataract    • Diabetic oculopathy associated with type 2 diabetes mellitus    • Exanthematous disorder    • Impacted cerumen of left ear    • Infection of skin and subcutaneous tissue    • Influenza    • Lymphadenopathy    • Nuclear cataract    • Proliferative diabetic retinopathy     stable, s/p PRP    • Vitreous hemorrhage     OS, INCREASED       No Known Allergies      Current Outpatient Prescriptions:   •  benzonatate (TESSALON) 200 MG capsule, Take 200 mg by mouth 3 (three) times a day as needed for cough., Disp: , Rfl:   •  clonazePAM (KlonoPIN) 0.5 MG tablet, Take 0.5 mg by mouth daily., Disp: , Rfl:   •  furosemide (LASIX) 40 MG tablet, , Disp: , Rfl:   •  gabapentin (NEURONTIN) 400 MG capsule, , Disp: , Rfl:   •  GABAPENTIN PO, Take  by mouth., Disp: , Rfl:   •  Insulin Admin Supplies misc, , Disp: , Rfl:   •  levothyroxine (SYNTHROID, LEVOTHROID) 75 MCG tablet, , Disp: , Rfl:   •  LISINOPRIL PO, Take  by mouth., Disp: , Rfl:   •  LORATADINE PO, Take  by mouth., Disp: , Rfl:   •  MELOXICAM PO, Take  by mouth., Disp: , Rfl:   •  METFORMIN HCL PO, Take  by mouth., Disp: , Rfl:   •  NOVOLIN N RELION 100 UNIT/ML injection, , Disp: , Rfl:   •  NOVOLIN R RELION 100 UNIT/ML injection, , Disp: , Rfl:   •  PARoxetine (PAXIL) 10 MG tablet, Take 10 mg by mouth. 1 tablet(s) by mouth before noon, Disp: , Rfl:   •  traMADol (ULTRAM) 50 MG tablet, , Disp: , Rfl:     Past Surgical History:   Procedure Laterality Date   • OTHER SURGICAL HISTORY   "06/01/2015    FUNDUS PHOTOGRAPHY 87316 (Proliferative diabetic retinopathy)    • OTHER SURGICAL HISTORY      TREATMENT RETINAL LESION W/PHOTOCOAGULATIO 50033 (Proliferative diabetic retinopathy) (2): 09/16/2015, 06/12/2015       ROS  No fevers or chills.  No chest pain or shortness of air.  No GI or  disturbances.    PHYSICAL EXAMINATION:       Ht 167.6 cm (66\")  Wt (!) 191 kg (420 lb)  BMI 67.79 kg/m2    Physical Exam   Constitutional: He is oriented to person, place, and time. Vital signs are normal. He appears well-developed and well-nourished. He is cooperative.   HENT:   Head: Normocephalic and atraumatic.   Neck: Trachea normal and phonation normal.   Pulmonary/Chest: Effort normal. No respiratory distress.   Abdominal: Soft. Normal appearance. He exhibits no distension.   Musculoskeletal:        Left knee: He exhibits no effusion.   Neurological: He is alert and oriented to person, place, and time. GCS eye subscore is 4. GCS verbal subscore is 5. GCS motor subscore is 6.   Skin: Skin is warm, dry and intact.   Psychiatric: He has a normal mood and affect. His speech is normal and behavior is normal. Judgment and thought content normal. Cognition and memory are normal.   Vitals reviewed.      GAIT:     []  Normal  []  Antalgic    Assistive device: []  None  []  Walker     []  Crutches  []  Cane     []  Wheelchair  []  Stretcher    Right Knee Exam   Right knee exam is normal.    Muscle Strength     The patient has normal right knee strength.      Left Knee Exam     Tenderness   The patient is experiencing tenderness in the medial joint line and pes anserinus.    Range of Motion   Extension: normal   Flexion: abnormal     Other   Erythema: absent  Scars: absent  Sensation: normal  Pulse: present  Swelling: mild  Effusion: no effusion present              No results found.  Large Joint Arthrocentesis  Date/Time: 12/11/2017 3:53 PM  Consent given by: patient  Site marked: site marked  Timeout: Immediately " prior to procedure a time out was called to verify the correct patient, procedure, equipment, support staff and site/side marked as required   Supporting Documentation  Indications: pain   Procedure Details  Location: knee - L knee  Preparation: Patient was prepped and draped in the usual sterile fashion  Needle size: 22 G  Approach: anteromedial  Medications administered: 40 mg triamcinolone acetonide 40 MG/ML; 2 mL lidocaine 1 %  Patient tolerance: patient tolerated the procedure well with no immediate complications                ASSESSMENT:    Diagnoses and all orders for this visit:    Primary osteoarthritis of both knees  -     Large Joint Arthrocentesis    Chronic pain of left knee  -     Large Joint Arthrocentesis          PLAN  Repeated the interarticular injection of steroids today and recommended continued modified weight baring with cane, aggressive weight reduction and f/u in three months for recheck.   No Follow-up on file.    Noah Gentile, APRN

## 2021-08-16 ENCOUNTER — OFFICE VISIT (OUTPATIENT)
Dept: WOUND CARE | Facility: HOSPITAL | Age: 66
End: 2021-08-16

## 2021-08-16 PROCEDURE — G0463 HOSPITAL OUTPT CLINIC VISIT: HCPCS

## 2021-08-20 ENCOUNTER — HOME HEALTH ADMISSION (OUTPATIENT)
Dept: HOME HEALTH SERVICES | Facility: HOME HEALTHCARE | Age: 66
End: 2021-08-20